# Patient Record
Sex: FEMALE | Race: BLACK OR AFRICAN AMERICAN | NOT HISPANIC OR LATINO | URBAN - METROPOLITAN AREA
[De-identification: names, ages, dates, MRNs, and addresses within clinical notes are randomized per-mention and may not be internally consistent; named-entity substitution may affect disease eponyms.]

---

## 2024-04-03 ENCOUNTER — EMERGENCY (EMERGENCY)
Facility: HOSPITAL | Age: 29
LOS: 1 days | Discharge: HOME | End: 2024-04-03
Attending: STUDENT IN AN ORGANIZED HEALTH CARE EDUCATION/TRAINING PROGRAM | Admitting: STUDENT IN AN ORGANIZED HEALTH CARE EDUCATION/TRAINING PROGRAM
Payer: SELF-PAY

## 2024-04-03 VITALS
DIASTOLIC BLOOD PRESSURE: 84 MMHG | HEART RATE: 100 BPM | TEMPERATURE: 99 F | RESPIRATION RATE: 18 BRPM | OXYGEN SATURATION: 100 % | SYSTOLIC BLOOD PRESSURE: 126 MMHG

## 2024-04-03 PROCEDURE — 99284 EMERGENCY DEPT VISIT MOD MDM: CPT

## 2024-04-03 RX ORDER — ONDANSETRON 8 MG/1
1 TABLET, FILM COATED ORAL
Qty: 9 | Refills: 0
Start: 2024-04-03 | End: 2024-04-05

## 2024-04-03 NOTE — ED ADULT NURSE NOTE - OBJECTIVE STATEMENT
Pt arrives to ED intake room 7. Pt is A&Ox4, ambulatory at baseline. Pt C/O fevers  c/o fevers, body aches, and vomiting since yesterday. no hx Pt arrives to ED intake room 7. Pt is A&Ox4, ambulatory at baseline. Pt C/O fevers   c/o fevers, body aches, and vomiting since yesterday. no hx Pt arrives to ED intake room 7. Pt is A&Ox4, ambulatory at baseline. Pt C/O fevers, body aches, and vomiting x 1 day. Pt denies diarrhea, dizziness, numbness, CP, vision changes. Airway is intact, respirations are even and unlabored, abdomen is soft and nondistended, capillary refill is 2 seconds bilaterally, skin is clean, dry, and intact. RVP swabbed and collected. Bed in lowest position, comfort measures provided. safety maintained.

## 2024-04-03 NOTE — ED PROVIDER NOTE - PATIENT PORTAL LINK FT
You can access the FollowMyHealth Patient Portal offered by [facility] by registering at the following website: [email or web address] By joining [facility]’s FollowMyHealth portal, you will also be able to view your health information using other applications (apps) compatible with our system.

## 2024-04-03 NOTE — ED PROVIDER NOTE - PHYSICAL EXAMINATION
Gen: no acute distress, well appearing, awake, alert and oriented x 3  Head: normocephalic, atraumatic  Eyes: pupils equal round and reactive to light and accomodation, extraocular mucles intact, normal conjunctiva, no periorbital swelling  Ears, Nose Throat: tympanic membrane intact, normal turbinates, no septal hematoma, oropharynx nonerythematous, uvula midline, no tonsillar swelling or exudates, moist mucosa, neck supple with FROM, no lymphadenopathy, no masses, no JVD  Cardiac: regular rate and rhythm, +S1S2  Pulm: Clear to auscultation bilaterally  Abd: soft, nontender, nondistended, no guarding  Back: neg CVA ttp, nontender spine  Extremity: no edema, no deformity, warm and well perfused, FROM all extremities    Neuro: awake, alert, oriented x 3, sensorimotor intact  Psych: normal affect

## 2024-04-03 NOTE — ED PROVIDER NOTE - CLINICAL SUMMARY MEDICAL DECISION MAKING FREE TEXT BOX
28-year-old female with no past medical history presents to ED for evaluation of fevers that started 3 days ago. A/P Labs, medicate, reassess

## 2024-04-03 NOTE — ED PROVIDER NOTE - OBJECTIVE STATEMENT
28-year-old female with no past medical history presents to ED for evaluation of fevers that started 3 days ago.  All associated with URI symptoms, body aches, nausea, vomiting, migraines.  Denies urinary symptoms.  Denies chest pain, shortness of breath.  Positive sick contacts.  Past surgical history lumbar discectomy.  On menses now.  No known drug allergies

## 2024-04-03 NOTE — ED PROVIDER NOTE - NSFOLLOWUPINSTRUCTIONS_ED_ALL_ED_FT
Please return to Emergency Department immediately for any new, concerning, or worsening symptoms.   Please follow-up with primary medical doctor as recommended.    Any results obtained today during your evaluation is attached and available in your portal. Please take all your results to follow up with your primary care doctor so that they can determine if you need any additional testing or treatment as an outpatient.   Please take prescriptions as discussed.

## 2024-04-04 LAB
FLUAV AG NPH QL: SIGNIFICANT CHANGE UP
FLUBV AG NPH QL: DETECTED
RSV RNA NPH QL NAA+NON-PROBE: SIGNIFICANT CHANGE UP
SARS-COV-2 RNA SPEC QL NAA+PROBE: SIGNIFICANT CHANGE UP